# Patient Record
Sex: FEMALE | Race: BLACK OR AFRICAN AMERICAN | NOT HISPANIC OR LATINO | URBAN - METROPOLITAN AREA
[De-identification: names, ages, dates, MRNs, and addresses within clinical notes are randomized per-mention and may not be internally consistent; named-entity substitution may affect disease eponyms.]

---

## 2022-01-08 ENCOUNTER — EMERGENCY (EMERGENCY)
Facility: HOSPITAL | Age: 37
LOS: 1 days | Discharge: HOME | End: 2022-01-08
Admitting: EMERGENCY MEDICINE
Payer: MEDICAID

## 2022-01-08 VITALS
TEMPERATURE: 98 F | OXYGEN SATURATION: 100 % | RESPIRATION RATE: 18 BRPM | HEART RATE: 105 BPM | DIASTOLIC BLOOD PRESSURE: 78 MMHG | SYSTOLIC BLOOD PRESSURE: 111 MMHG | WEIGHT: 244.93 LBS

## 2022-01-08 DIAGNOSIS — N76.0 ACUTE VAGINITIS: ICD-10-CM

## 2022-01-08 DIAGNOSIS — Z87.42 PERSONAL HISTORY OF OTHER DISEASES OF THE FEMALE GENITAL TRACT: ICD-10-CM

## 2022-01-08 DIAGNOSIS — U07.1 COVID-19: ICD-10-CM

## 2022-01-08 DIAGNOSIS — N89.8 OTHER SPECIFIED NONINFLAMMATORY DISORDERS OF VAGINA: ICD-10-CM

## 2022-01-08 LAB — SARS-COV-2 RNA SPEC QL NAA+PROBE: DETECTED

## 2022-01-08 PROCEDURE — U0003: CPT

## 2022-01-08 PROCEDURE — U0005: CPT

## 2022-01-08 PROCEDURE — 36415 COLL VENOUS BLD VENIPUNCTURE: CPT

## 2022-01-08 PROCEDURE — 99283 EMERGENCY DEPT VISIT LOW MDM: CPT

## 2022-01-08 PROCEDURE — 99284 EMERGENCY DEPT VISIT MOD MDM: CPT

## 2022-01-08 PROCEDURE — 87800 DETECT AGNT MULT DNA DIREC: CPT

## 2022-01-08 RX ORDER — METRONIDAZOLE 500 MG
1 TABLET ORAL
Qty: 14 | Refills: 0
Start: 2022-01-08 | End: 2022-01-14

## 2022-01-08 RX ORDER — METRONIDAZOLE 7.5 MG/G
1 GEL VAGINAL
Qty: 5 | Refills: 0
Start: 2022-01-08 | End: 2022-01-12

## 2022-01-08 RX ORDER — METRONIDAZOLE 500 MG
500 TABLET ORAL ONCE
Refills: 0 | Status: COMPLETED | OUTPATIENT
Start: 2022-01-08 | End: 2022-01-08

## 2022-01-08 NOTE — ED PROVIDER NOTE - CLINICAL SUMMARY MEDICAL DECISION MAKING FREE TEXT BOX
35 y/o female with a PMHx of uterine fibroids and hx of BV an yeast infection in the past is here in the ED c/o vaginal dc, however adamently denies any concerns for stds. Testing offered, however declined. Pelvic exam without ttp. Findings consistent with BV. Will treat empirically. Pt also tested for covid with exposure concerns. No abdominal/pelvic ttp. No concern for toa or pid. I have discussed the discharge plan with the patient. The patient agrees with the plan, as discussed.  The patient understands Emergency Department diagnosis is a preliminary diagnosis often based on limited information and that the patient must adhere to the follow-up plan as discussed.  The patient understands that if the symptoms worsen or if prescribed medications do not have the desired/planned effect that the patient may return to the Emergency Department at any time for further evaluation and treatment. 35 y/o female with a PMHx of uterine fibroids and hx of BV an yeast infection in the past is here in the ED c/o vaginal dc, however adamently denies any concerns for stds. Testing offered, however declined. Pelvic exam without ttp. Findings consistent with BV. Will treat empirically. Pt also tested for covid with exposure concerns. No abdominal/pelvic ttp. No concern for toa or pid. upreg negative. I have discussed the discharge plan with the patient. The patient agrees with the plan, as discussed.  The patient understands Emergency Department diagnosis is a preliminary diagnosis often based on limited information and that the patient must adhere to the follow-up plan as discussed.  The patient understands that if the symptoms worsen or if prescribed medications do not have the desired/planned effect that the patient may return to the Emergency Department at any time for further evaluation and treatment.

## 2022-01-08 NOTE — ED PROVIDER NOTE - NSFOLLOWUPINSTRUCTIONS_ED_ALL_ED_FT
Please take medication as directed and follow up with your PMD. Return to the Emergency Department if you have any new or worsening symptoms, or if you have any concerns.    YOU WILL BE CONTACTED IN 2-3 DAYS REGARDING YOUR LAB RESULTS.     Please reach out to Mare Richardson ([facility] ED clinical referral coordinator) to assist you with your follow-up appointment with your GYN if needed.     Monday - Friday 11am-7pm  [phone]  [email or web address]    Vaginitis    WHAT YOU NEED TO KNOW:    What is vaginitis? Vaginitis is inflammation or infection of your vagina. Vaginitis is commonly caused by a bacterial or fungal infection. Other causes include a foreign object or exposure to a chemical irritant. You may be given medicines to treat an infection caused by bacteria or a fungus. Medicines may be given as a pill, or as a cream, gel, or tablet you insert into your vagina.    What are the signs and symptoms of vaginitis?   •Pain, itching, redness, burning, or swelling in your vagina      •An odor from your vagina that may be foul or smell like fish      •Thick, curd-like discharge      •Thin, gray-white discharge      •Small skin tears or chafing      •Painful sexual intercourse      •Pain when you urinate      How can I manage my symptoms?   •Take a sitz bath. Fill a bathtub with 4 to 6 inches of warm water. You may also use a sitz bath pan that fits inside a toilet bowl. Sit in the sitz bath for 15 minutes. Do this 3 times a day, and after each bowel movement. The warm water can help decrease pain and swelling.      •Use over-the-counter creams or ointments as directed. Examples include petroleum jelly, zinc creams, or hydrocortisone creams. These will help decrease itching and inflammation.      •Do not wear tight-fitting clothes or undergarments. These can make your symptoms worse.      •Do not use douches other irritating products in your vagina. Examples include bubble baths and perfumed soaps. The vagina is delicate and easily irritated. Ask your healthcare provider if it is okay to use tampons during your monthly periods. You may need to use pads instead until your symptoms go away.      •Do not have sex until your symptoms go away. When you have sex, always use a condom. Condoms can help protect you from contact with fluids from your partner that may be causing your vaginitis.      How can I help prevent infection?   •Wash your hands with soap and water after you use the toilet.  Handwashing           •Wipe from front to back. Do this after you urinate or have a bowel movement. This will prevent germs from getting into your vagina.      •Wash your vagina each day. Use mild, unscented soap. Let the area air dry.       When should I call my doctor?   •You have unusual vaginal bleeding.      •You have severe abdominal pain.      •You have a fever.      •Your symptoms get worse, even after treatment.      •Your symptoms return.      •You have questions or concerns about your condition or care.      CARE AGREEMENT:    You have the right to help plan your care. Learn about your health condition and how it may be treated. Discuss treatment options with your healthcare providers to decide what care you want to receive. You always have the right to refuse treatment.        © Copyright [employer] 2022           back to top                          © Copyright [employer] 2022

## 2022-01-08 NOTE — ED PROVIDER NOTE - OBJECTIVE STATEMENT
37 y/o female with a PMHx of uterine fibroids is here in the ED c/o vaginal discharge x1 week. She describes a white discharge associated with irritation. Pt reports hx of BV and yeast infection. She took a pill she had for yeast infection which did not alleviate her symptoms. She denies the following: fever, chills, n/v, abdominal pain, pelvic pain, urinary symptoms, vaginal bleeding. Furthermore, pt has no concerns for stds. Additionally, pt requesting for asymptomatic covid testing due to exposure.

## 2022-01-08 NOTE — ED ADULT NURSE NOTE - OBJECTIVE STATEMENT
Patient is a 35yo female reporting grayish vaginal discharge over past few days. Pt denies vaginal bleeding, hematuria, dysuria.

## 2022-01-08 NOTE — ED ADULT TRIAGE NOTE - CHIEF COMPLAINT QUOTE
Pt complains of grayish vaginal discharge X1 week. pt denies itchiness or fever. Also requesting COVID test

## 2022-01-08 NOTE — ED PROVIDER NOTE - PATIENT PORTAL LINK FT
You can access the FollowMyHealth Patient Portal offered by [facility] by registering at the following website: [email or web address]. By joining [facility]’s FollowMyHealth portal, you will also be able to view your health information using other applications (apps) compatible with our system.

## 2022-01-10 LAB
CANDIDA AB TITR SER: SIGNIFICANT CHANGE UP
G VAGINALIS DNA SPEC QL NAA+PROBE: SIGNIFICANT CHANGE UP
T VAGINALIS SPEC QL WET PREP: SIGNIFICANT CHANGE UP